# Patient Record
Sex: MALE | Race: BLACK OR AFRICAN AMERICAN | URBAN - METROPOLITAN AREA
[De-identification: names, ages, dates, MRNs, and addresses within clinical notes are randomized per-mention and may not be internally consistent; named-entity substitution may affect disease eponyms.]

---

## 2018-03-25 ENCOUNTER — OFFICE VISIT (OUTPATIENT)
Dept: URGENT CARE | Facility: URGENT CARE | Age: 33
End: 2018-03-25
Payer: MEDICAID

## 2018-03-25 VITALS
RESPIRATION RATE: 20 BRPM | SYSTOLIC BLOOD PRESSURE: 140 MMHG | TEMPERATURE: 98.8 F | WEIGHT: 211.5 LBS | HEART RATE: 92 BPM | DIASTOLIC BLOOD PRESSURE: 79 MMHG

## 2018-03-25 DIAGNOSIS — B07.0 PLANTAR WARTS: Primary | ICD-10-CM

## 2018-03-25 PROCEDURE — 17110 DESTRUCTION B9 LES UP TO 14: CPT | Performed by: FAMILY MEDICINE

## 2018-03-25 NOTE — MR AVS SNAPSHOT
After Visit Summary   3/25/2018    Raji Bailon Jr.    MRN: [id number]           Patient Information     Date Of Birth          1985        Visit Information        Provider Department      3/25/2018 6:25 PM Emy Pink MD [facility]        Care Instructions      Plantar Warts  Warts are common skin growths that can appear anywhere on the body. Warts on the soles of the feet are called plantar warts. These warts are not a serious health problem. They usually go away without treatment. But plantar warts can be painful when you stand or walk. If this is the case, special cushions can help relieve pressure and pain. Drugstores carry these cushions and you can buy them without a prescription. If cushions do not work and the pain interferes with walking, the wart can be removed.  General care    Your healthcare provider may remove the plantar wart:    With prescription medications. These may be placed directly on the wart at each office visit. Or you may be sent home with the medication.    With a blade, or by freezing (cryotherapy), burning (electrocautery), or laser treatment.    You may be instructed to treat the wart yourself at home using an over-the-counter wart-removal medication (such as 40 percent salicylic acid). Apply the medication to the wart every day as directed. Avoid the healthy skin around the wart. In between applications, remove the dead wart tissue using the type of file suggested by your health care provider. You will likely need to repeat this process for several weeks to remove the entire wart.    Warts can spread from your foot to other parts of your body and to other people. Do not scratch or pick at the wart. Wash your hands well before and after touching your warts.    Warts often come back, even after successful treatment. Return promptly for treatment of any new warts.  Follow-up care  Follow up with your health care provider,  "or as advised.  When to seek medical advice    Signs of infection (red streaks, pus, smelly or colored discharge, or fever) appear.    You experience heavy bleeding or bleeding that won t stop with light pressure.    The wart doesn t go away after several weeks of self-care.     New warts appear on feet, hands, or face.  Date Last Reviewed: 8/19/2015 2000-2017 [employer]. [address] [place] [address], [place] [address]. All rights reserved. This information is not intended as a substitute for professional medical care. Always follow your healthcare professional's instructions.                Follow-ups after your visit        Who to contact     If you have questions or need follow up information about today's clinic visit or your schedule please contact [facility] directly at [phone].  Normal or non-critical lab and imaging results will be communicated to you by miloghart, letter or phone within 4 business days after the clinic has received the results. If you do not hear from us within 7 days, please contact the clinic through miloghart or phone. If you have a critical or abnormal lab result, we will notify you by phone as soon as possible.  Submit refill requests through Vakast or call your pharmacy and they will forward the refill request to us. Please allow 3 business days for your refill to be completed.          Additional Information About Your Visit        milogharRangespan Information     Vakast lets you send messages to your doctor, view your test results, renew your prescriptions, schedule appointments and more. To sign up, go to [email or web address] . Click on \"Log in\" on the left side of the screen, which will take you to the Welcome page. Then click on \"Sign up Now\" on the right side of the page.     You will be asked to enter the access code listed below, as well as some personal information. Please follow the directions to create your username and password.   "   Your access code is: -  Expires: 2018  8:08 PM     Your access code will  in 90 days. If you need help or a new code, please call your [facility] clinic or [phone].        Care EveryWhere ID     This is your Care EveryWhere ID. This could be used by other organizations to access your [facility] medical records  [id number]        Your Vitals Were     Pulse Temperature Respirations             92 98.8  F (37.1  C) (Oral) 20          Blood Pressure from Last 3 Encounters:   18 140/79    Weight from Last 3 Encounters:   18 211 lb 8 oz (95.9 kg)              Today, you had the following     No orders found for display       Primary Care Provider Fax #    Physician No Ref-Primary [phone]       No address on file        Equal Access to Services     [facility] : Hadii r[address] Sosteffaine, waaxda luqadaha, qaybta kaalmada adeegyada, nataliia nichole . So [facility] [phone].    ATENCIÓN: Si habla español, tiene a olvera disposición servicios gratuitos de asistencia lingüística. Llame al [phone].    We comply with applicable federal civil rights laws and [place] laws. We do not discriminate on the basis of race, color, national origin, age, disability, sex, sexual orientation, or gender identity.            Thank you!     Thank you for choosing [facility]  for your care. Our goal is always to provide you with excellent care. Hearing back from our patients is one way we can continue to improve our services. Please take a few minutes to complete the written survey that you may receive in the mail after your visit with us. Thank you!             Your Updated Medication List - Protect others around you: Learn how to safely use, store and throw away your medicines at www.disposemymeds.org.      Notice  As of 3/25/2018  8:08 PM    You have not been prescribed any medications.

## 2018-03-25 NOTE — LETTER
March 25, 2018      Raji Bailon Jr.  [address] [address]        To Whom It May Concern:    Raji Bailon Jr.  was seen on 3/25/2018.  Please excuse him until 3/26/2018 due to illness.        Sincerely,        Emy Pink MD

## 2018-03-26 NOTE — PATIENT INSTRUCTIONS
Plantar Warts  Warts are common skin growths that can appear anywhere on the body. Warts on the soles of the feet are called plantar warts. These warts are not a serious health problem. They usually go away without treatment. But plantar warts can be painful when you stand or walk. If this is the case, special cushions can help relieve pressure and pain. Drugstores carry these cushions and you can buy them without a prescription. If cushions do not work and the pain interferes with walking, the wart can be removed.  General care    Your healthcare provider may remove the plantar wart:    With prescription medications. These may be placed directly on the wart at each office visit. Or you may be sent home with the medication.    With a blade, or by freezing (cryotherapy), burning (electrocautery), or laser treatment.    You may be instructed to treat the wart yourself at home using an over-the-counter wart-removal medication (such as 40 percent salicylic acid). Apply the medication to the wart every day as directed. Avoid the healthy skin around the wart. In between applications, remove the dead wart tissue using the type of file suggested by your health care provider. You will likely need to repeat this process for several weeks to remove the entire wart.    Warts can spread from your foot to other parts of your body and to other people. Do not scratch or pick at the wart. Wash your hands well before and after touching your warts.    Warts often come back, even after successful treatment. Return promptly for treatment of any new warts.  Follow-up care  Follow up with your health care provider, or as advised.  When to seek medical advice    Signs of infection (red streaks, pus, smelly or colored discharge, or fever) appear.    You experience heavy bleeding or bleeding that won t stop with light pressure.    The wart doesn t go away after several weeks of self-care.     New warts appear on feet, hands, or face.  Date  Last Reviewed: 8/19/2015 2000-2017 [employer]. [address] [place] [address], [place] [address]. All rights reserved. This information is not intended as a substitute for professional medical care. Always follow your healthcare professional's instructions.

## 2018-03-26 NOTE — PROGRESS NOTES
SUBJECTIVE:   Raji Bailon Jr. is a 32 year old male presenting with a chief complaint of   Chief Complaint   Patient presents with     Foot Problems     Rt foot bump notice x few days ago and pain started pain. Pt also states that he notced red bump on Rt hip.      Patient presents with a bump on his right foot. Apperas like a plantar wart . Has had this for many years causing a lot of pain for him.   He is a new patient of [facility].    Onset of symptoms was 1 year(s) ago.  Location: right foot  Context:       Has wart on the plantar surface causing pain         Patient experienced delayed pain  Course of symptoms is worsening.    Severity moderate  Current and Associated symptoms: Pain  Denies  Redness  Aggravating Factors: walking  Therapies to improve symptoms include: none  This is the first time this type of problem has occurred for this patient.       Review of Systems    No past medical history on file.  No family history on file.  No current outpatient prescriptions on file.     Social History   Substance Use Topics     Smoking status: Current Every Day Smoker     Smokeless tobacco: Never Used     Alcohol use Not on file       OBJECTIVE  /79  Pulse 92  Temp 98.8  F (37.1  C) (Oral)  Resp 20  Wt 211 lb 8 oz (95.9 kg)    Physical Exam   Constitutional: He appears well-developed and well-nourished.   HENT:   Head: Normocephalic and atraumatic.   Feet:   Right Foot:   Skin Integrity: Positive for callus and dry skin.       Labs:  No results found for this or any previous visit (from the past 24 hour(s)).    X-Ray was not done.    ASSESSMENT:      ICD-10-CM    1. Plantar warts B07.0         Medical Decision Making:    Differential Diagnosis:  Plantar warts    Serious Comorbid Conditions:  Adult:  None    PLAN:    MS Injury/Pain  Treated with liquid nitrogen     Followup:    If not improving or if condition worsens, follow up with your Primary Care Provider    Patient Instructions     Plantar  Warts  Warts are common skin growths that can appear anywhere on the body. Warts on the soles of the feet are called plantar warts. These warts are not a serious health problem. They usually go away without treatment. But plantar warts can be painful when you stand or walk. If this is the case, special cushions can help relieve pressure and pain. Drugstores carry these cushions and you can buy them without a prescription. If cushions do not work and the pain interferes with walking, the wart can be removed.  General care    Your healthcare provider may remove the plantar wart:    With prescription medications. These may be placed directly on the wart at each office visit. Or you may be sent home with the medication.    With a blade, or by freezing (cryotherapy), burning (electrocautery), or laser treatment.    You may be instructed to treat the wart yourself at home using an over-the-counter wart-removal medication (such as 40 percent salicylic acid). Apply the medication to the wart every day as directed. Avoid the healthy skin around the wart. In between applications, remove the dead wart tissue using the type of file suggested by your health care provider. You will likely need to repeat this process for several weeks to remove the entire wart.    Warts can spread from your foot to other parts of your body and to other people. Do not scratch or pick at the wart. Wash your hands well before and after touching your warts.    Warts often come back, even after successful treatment. Return promptly for treatment of any new warts.  Follow-up care  Follow up with your health care provider, or as advised.  When to seek medical advice    Signs of infection (red streaks, pus, smelly or colored discharge, or fever) appear.    You experience heavy bleeding or bleeding that won t stop with light pressure.    The wart doesn t go away after several weeks of self-care.     New warts appear on feet, hands, or face.  Date Last  Reviewed: 8/19/2015 2000-2017 [employer]. [address] [place] [address], [place] [address]. All rights reserved. This information is not intended as a substitute for professional medical care. Always follow your healthcare professional's instructions.